# Patient Record
Sex: MALE | Race: WHITE | NOT HISPANIC OR LATINO | Employment: FULL TIME | URBAN - NONMETROPOLITAN AREA
[De-identification: names, ages, dates, MRNs, and addresses within clinical notes are randomized per-mention and may not be internally consistent; named-entity substitution may affect disease eponyms.]

---

## 2018-04-21 ENCOUNTER — OFFICE VISIT (OUTPATIENT)
Dept: FAMILY MEDICINE | Facility: OTHER | Age: 28
End: 2018-04-21
Attending: NURSE PRACTITIONER

## 2018-04-21 VITALS
TEMPERATURE: 98.9 F | HEIGHT: 70 IN | WEIGHT: 219.9 LBS | DIASTOLIC BLOOD PRESSURE: 74 MMHG | RESPIRATION RATE: 22 BRPM | HEART RATE: 86 BPM | BODY MASS INDEX: 31.48 KG/M2 | SYSTOLIC BLOOD PRESSURE: 120 MMHG

## 2018-04-21 DIAGNOSIS — L03.011 PARONYCHIA OF RIGHT INDEX FINGER: ICD-10-CM

## 2018-04-21 DIAGNOSIS — T14.8XXA SPLINTER IN SKIN: Primary | ICD-10-CM

## 2018-04-21 PROCEDURE — 99213 OFFICE O/P EST LOW 20 MIN: CPT | Performed by: NURSE PRACTITIONER

## 2018-04-21 ASSESSMENT — PAIN SCALES - GENERAL: PAINLEVEL: SEVERE PAIN (6)

## 2018-04-21 NOTE — NURSING NOTE
Patient presents to the clinic for right pointer finger possible infection. States it's been bothering him for about 2 weeks. No injury that he is aware of.  Afsaneh Vazquez LPN............. April 21, 2018 3:37 PM

## 2018-04-21 NOTE — PATIENT INSTRUCTIONS
Monitor for any infection - redness, swelling, pus    Try Ibuprofen or Naproxen for pain    Soak in warm water for 5-10 minutes twice daily    Use antibiotic ointment as needed    Follow up as needed

## 2018-04-21 NOTE — PROGRESS NOTES
"HPI:    Yo Tiwari is a 28 year old male  who presents to clinic today for finger pain.      States pain in right index finger for about 2 weeks.  Denies any known injury.  States swelling for the past 3-4 days with some redness around the fingernail.  No known splinters but he does work restoring and sandblasting log homes.   No drainage or bleeding.  Denies any numbness.   Occasional mild tingling.  No fevers.    Denies any home treatments - no soaking, no heat or ice.    No tylenol, ibuprofen or naproxen.          No past medical history on file.  No past surgical history on file.  Social History   Substance Use Topics     Smoking status: Former Smoker     Smokeless tobacco: Current User     Types: Chew     Alcohol use 1.2 oz/week     2 Shots of liquor per week      Comment: 2 shots of liquor per night     No current outpatient prescriptions on file.     No Known Allergies      Past medical history, past surgical history, current medications and allergies reviewed and accurate to the best of my knowledge.        ROS:  Refer to [facility]    /74 (BP Location: Left arm, Patient Position: Sitting, Cuff Size: Adult Regular)  Pulse 86  Temp 98.9  F (37.2  C) (Tympanic)  Resp 22  Ht 5' 10\" (1.778 m)  Wt 219 lb 14.4 oz (99.7 kg)  BMI 31.55 kg/m2    EXAM:  General Appearance: Well appearing adult male, appropriate appearance for age. No acute distress  Respiratory: Normal effort.  No cough appreciated.  Cardiovascular:  CMS intact to right upper extremity, brisk capillary refill, strong radial pulse  Musculoskeletal:  Right index finger with localized swelling and tenderness to palpation along the side of fingernail.  Normal movement of right fingers without difficulty.  Normal gait.  Equal movement of bilateral upper extremities.  Equal movement of bilateral lower extremities.    Dermatological: Right index finger with localized erythema, swelling, and fluctuance along the side of the nail.  Multiple tiny splints " noted under the tip of the fingernail of the right index finger.    Psychological: normal affect, alert and pleasant      Procedural note:   Options are discussed and Yo has elected to have an incision and drainage performed of his right index finger paronychia and removal of retrained splinters/slivers   Risks and benefits discussed.  Written consent obtained.    Right index finger soaked x 15 minutes in warm water.  A large bore filter needle tip was used to remove a retained splinter in the center of the paronychia with small amount of purulent drainage released.    Multiple tiny slivers were then removed using a large bore filter needle tip from the right index finger nail tip.  No bleeding present.    Patient tolerated the procedure well. No complications were appreciated.        ASSESSMENT/PLAN:    ICD-10-CM    1. Splinter in skin T14.8XXA REMOVE FOREIGN BODY SIMPLE   2. Paronychia of right index finger L03.011 CANCELED: DRAIN SKIN ABSCESS SIMPLE/SINGLE       Splinters removed from right index finger without complication or bleeding.  Paronychia was also drained during removal of splinters.    Discussed wound care at home - warm water soaks BID x 2 days, antibiotic ointment PRN, wear gloves to prevent future/further splinters.  Discussed monitoring for infection    Tylenol or ibuprofen or naproxen PRN    Discussed warning signs/symptoms indicative of need to f/u    Follow up if symptoms persist or worsen or concerns

## 2018-04-21 NOTE — MR AVS SNAPSHOT
"              After Visit Summary   4/21/2018    Yo Tiwari    MRN: [id number]           Patient Information     Date Of Birth          1990        Visit Information        Provider Department      4/21/2018 3:15 PM Alia Osullivan NP [facility]        Today's Diagnoses     Paronychia of right index finger    -  1    Splinter in skin          Care Instructions    Monitor for any infection - redness, swelling, pus    Try Ibuprofen or Naproxen for pain    Soak in warm water for 5-10 minutes twice daily    Use antibiotic ointment as needed    Follow up as needed          Follow-ups after your visit        Who to contact     If you have questions or need follow up information about today's clinic visit or your schedule please contact [facility] directly at [phone].  Normal or non-critical lab and imaging results will be communicated to you by sickweatherhart, letter or phone within 4 business days after the clinic has received the results. If you do not hear from us within 7 days, please contact the clinic through sickweatherhart or phone. If you have a critical or abnormal lab result, we will notify you by phone as soon as possible.  Submit refill requests through Ketsu or call your pharmacy and they will forward the refill request to us. Please allow 3 business days for your refill to be completed.          Additional Information About Your Visit        sickweatherhart Information     Ketsu lets you send messages to your doctor, view your test results, renew your prescriptions, schedule appointments and more. To sign up, go to [email or web address] . Click on \"Log in\" on the left side of the screen, which will take you to the Welcome page. Then click on \"Sign up Now\" on the right side of the page.     You will be asked to enter the access code listed below, as well as some personal information. Please follow the directions to create your username and password.     Your access " "code is: [email or web address]  Expires: 2018  4:22 PM     Your access code will  in 90 days. If you need help or a new code, please call your [facility] clinic or [phone].        Care EveryWhere ID     This is your Care EveryWhere ID. This could be used by other organizations to access your [facility] medical records  [id number]        Your Vitals Were     Pulse Temperature Respirations Height BMI (Body Mass Index)       86 98.9  F (37.2  C) (Tympanic) 22 5' 10\" (1.778 m) 31.55 kg/m2        Blood Pressure from Last 3 Encounters:   18 120/74    Weight from Last 3 Encounters:   18 219 lb 14.4 oz (99.7 kg)              We Performed the Following     DRAIN SKIN ABSCESS SIMPLE/SINGLE        Primary Care Provider Office Phone # Fax #    Isaac Cleveland [phone] [address] [facility] [address]        Equal Access to Services     [facility] AH: Hadii aad ku hadasho Soomaali, waaxda luqadaha, qaybta kaalmada adeegyada, waxay idiin hayaan jocelin kharash la'jem . So [facility] [phone].    ATENCIÓN: Si habla español, tiene a duvall disposición servicios gratuitos de asistencia lingüística. [facility] [phone].    We comply with applicable federal civil rights laws and [place] laws. We do not discriminate on the basis of race, color, national origin, age, disability, sex, sexual orientation, or gender identity.            Thank you!     Thank you for choosing [facility] AND [facility] [place]  for your care. Our goal is always to provide you with excellent care. Hearing back from our patients is one way we can continue to improve our services. Please take a few minutes to complete the written survey that you may receive in the mail after your visit with us. Thank you!             Your Updated Medication List - Protect others around you: Learn how to safely use, store and throw away your medicines at www.disposemymeds.org.      Notice  As of 2018  4:22 PM    You " have not been prescribed any medications.

## 2020-03-25 ENCOUNTER — VIRTUAL VISIT (OUTPATIENT)
Dept: FAMILY MEDICINE | Facility: OTHER | Age: 30
End: 2020-03-25
Attending: FAMILY MEDICINE

## 2020-03-25 VITALS — WEIGHT: 210 LBS | BODY MASS INDEX: 30.13 KG/M2

## 2020-03-25 DIAGNOSIS — R05.9 COUGH: Primary | ICD-10-CM

## 2020-03-25 PROCEDURE — 99212 OFFICE O/P EST SF 10 MIN: CPT | Mod: TEL | Performed by: FAMILY MEDICINE

## 2020-03-25 ASSESSMENT — PATIENT HEALTH QUESTIONNAIRE - PHQ9: SUM OF ALL RESPONSES TO PHQ QUESTIONS 1-9: 0

## 2020-03-25 ASSESSMENT — PAIN SCALES - GENERAL: PAINLEVEL: NO PAIN (0)

## 2020-03-25 NOTE — PROGRESS NOTES
"Yo Tiwari is a 29 year old male who is being evaluated via a billable telephone visit.    Patient requesting phone visit today for fever that started last night at 11pm lasting until 4-5 am. He did check this morning and his fever was down to 99.6 after ibuprofen. He does have an annoying cough. His mom and niece have been positive for strep in the the last week.     The patient has been notified of following:     \"This telephone visit will be conducted via a call between you and your physician/provider. We have found that certain health care needs can be provided without the need for a physical exam.  This service lets us provide the care you need with a short phone conversation.  If a prescription is necessary we can send it directly to your pharmacy.  If lab work is needed we can place an order for that and you can then stop by our lab to have the test done at a later time.    If during the course of the call the physician/provider feels a telephone visit is not appropriate, you will not be charged for this service.\"     Yo Tiwari complains of  No chief complaint on file.      I have reviewed and updated the patient's Past Medical History, Social History, Family History and Medication List.    ALLERGIES  Patient has no known allergies.      Additional provider notes: Patient calls concerned about a strep exposure.  He reports a tickle in his throat.  Fever last night of 99.6.  He has a little bit of annoying cough.  Was exposed to strep by his niece within the last week.  He is not complaining of any pain.  He is taken ibuprofen with some relief.  There is no cervical tenderness.  Cough is dry.  A headache last night.  He reports no muscle aches but does report a little joint ache last night.  No throat pain just a tickle.    Assessment/Plan:  1. Cough  I discussed the CDC recommendations for potential strep including muscle aches headache no cough no congestion.  Sore severe sore throat and cervical " adenopathy.  I does not meet the criteria for strep.  Advised him although I cannot completely rule that out a doubt that this is a strep throat.  He would need a clinic visit to evaluate it further.  He will continue with the ibuprofen.      Phone call duration: 12 minutes    Grant Broussard MD on 3/25/2020 at 2:20 PM

## 2020-12-27 ENCOUNTER — HEALTH MAINTENANCE LETTER (OUTPATIENT)
Age: 30
End: 2020-12-27

## 2021-06-29 NOTE — PROGRESS NOTES
Assessment & Plan     1. Anxiety  Patient's described symptoms and her longevity suggest a baseline anxiety that has been worsened with situational anxiety due to the recent passing of her sister-in-law.  Discussed treatment option including medication and/or therapy.  Patient would like proceed with medication at this time.  Given his brother has tolerated Lexapro well we will trial this with him as outlined below.  Educated on medication, use, side effects.  Will start with 1/2 tablet for 1 week then increase to 1 tablet daily going forward.  Follow-up in 4 weeks for recheck or sooner if needed  - escitalopram (LEXAPRO) 5 MG tablet; Take 1 tablet (5 mg) by mouth daily  Dispense: 30 tablet; Refill: 1  - hydrOXYzine (VISTARIL) 25 MG capsule; Take 1 capsule (25 mg) by mouth 3 times daily as needed for anxiety  Dispense: 30 capsule; Refill: 0    2. Situational anxiety  See #1.  - escitalopram (LEXAPRO) 5 MG tablet; Take 1 tablet (5 mg) by mouth daily  Dispense: 30 tablet; Refill: 1  - hydrOXYzine (VISTARIL) 25 MG capsule; Take 1 capsule (25 mg) by mouth 3 times daily as needed for anxiety  Dispense: 30 capsule; Refill: 0    3. Palpitations  Differential includes anxiety related, anxiety/panic attack, cardiac arrhythmia, electrolyte abnormalities, thyroid disease, etc.  EKG unremarkable other than sinus bradycardia today per my interpretation.  Lab work as outlined below was unremarkable.  Offered further work-up with Zio patch however patient would like to try medication as outlined above and if he continues to have symptoms we will proceed forward with Zio patch and possible echocardiogram.  Given warning signs and symptoms for need to follow-up sooner.  - CBC and Differential; Future  - Basic Metabolic Panel; Future  - Magnesium; Future  - TSH Reflex GH; Future  - EKG 12-lead, tracing only  - TSH Reflex GH  - Magnesium  - Basic Metabolic Panel  - CBC and Differential      No follow-ups on file.    Yuli ANN  SALTY Alatorre  [facility] AND [facility]    aDnia Ram is a 31 year old who presents for the following health issues    HPI   Here for discussion regarding mental health.  Patient states that he feels like he has trouble with baseline anxiety for quite some time however over the past month symptoms of anxiety and depression have worsened.  Approximately 1 month ago his sister-in-law passed away in a motor vehicle accident.  Since that time he has been living with his brother.  He does have good support system with family and.  He states that he has been struggling with excessive worry, anxiousness feeling down and depressed, anhedonia, decreased motivation.  Depending on the day his appetite varies.  Is having some difficulties with falling and staying asleep.  He is also noting that there has been a few episodes where he is feeling chest tightness, heart palpitations.  He is unsure if his anxiety is causing this however he does have an increased anxiety when he is feeling the symptoms.  No associated chest pain or pressure, dizziness, lightheadedness, syncope, headaches, vision changes, shortness of breath.  Patient states that he has had syncopal events in the past that was likely due to dehydration and being in warm weather and that causes him anxiety as well as he is nervous it may happen again.  Denies current self-harm, suicidal, homicidal thoughts.  He has never been on mental health medications.  He does have a strong family history of anxiety.  His brother does well with Lexapro and Wellbutrin for management.      PAST MEDICAL HISTORY: No past medical history on file.    PAST SURGICAL HISTORY: No past surgical history on file.    FAMILY HISTORY: No family history on file.    SOCIAL HISTORY:   Social History     Tobacco Use     Smoking status: Former Smoker     Smokeless tobacco: Current User     Types: Chew   Substance Use Topics     Alcohol use: Yes     Alcohol/week: 2.0 standard drinks      "Types: 2 Shots of liquor per week     Comment: 4  drinks a week       No Known Allergies    Current Outpatient Medications   Medication     escitalopram (LEXAPRO) 5 MG tablet     hydrOXYzine (VISTARIL) 25 MG capsule     No current facility-administered medications for this visit.          Review of Systems   Per HPI        Objective    /76   Pulse 80   Temp 98.1  F (36.7  C)   Resp 12   Ht 1.778 m (5' 10\")   Wt 98.6 kg (217 lb 6.4 oz)   SpO2 97%   BMI 31.19 kg/m    Body mass index is 31.19 kg/m .  Physical Exam   General: Pleasant, in no apparent distress.  Cardiovascular: Regular rate and rhythm with S1 equal to S2. No murmurs, friction rubs, or gallops.   Respiratory: Lungs are resonant and clear to auscultation bilaterally. No wheezes, crackles, or rhonchi.  Psych: Appropriate mood and affect.    PHQ 3/25/2020 6/30/2021   PHQ-9 Total Score 0 11   Q9: Thoughts of better off dead/self-harm past 2 weeks Not at all Not at all     SRAVANTHI-7 SCORE 6/30/2021   Total Score 10       Results for orders placed or performed in visit on 06/30/21   TSH Reflex GH     Status: None   Result Value Ref Range    TSH Reflex 1.39 0.34 - 5.60 IU/mL   Magnesium     Status: None   Result Value Ref Range    Magnesium 2.1 1.9 - 2.7 mg/dL   Basic Metabolic Panel     Status: None   Result Value Ref Range    Sodium 140 134 - 144 mmol/L    Potassium 4.2 3.5 - 5.1 mmol/L    Chloride 105 98 - 107 mmol/L    Carbon Dioxide 25 21 - 31 mmol/L    Anion Gap 10 3 - 14 mmol/L    Glucose 98 70 - 105 mg/dL    Urea Nitrogen 16 7 - 25 mg/dL    Creatinine 0.91 0.70 - 1.30 mg/dL    GFR Estimate >90 >60 mL/min/[1.73_m2]    GFR Estimate If Black >90 >60 mL/min/[1.73_m2]    Calcium 9.5 8.6 - 10.3 mg/dL   CBC and Differential     Status: None   Result Value Ref Range    WBC 7.0 4.0 - 11.0 10e9/L    RBC Count 5.35 4.4 - 5.9 10e12/L    Hemoglobin 15.8 13.3 - 17.7 g/dL    Hematocrit 47.2 40.0 - 53.0 %    MCV 88 78 - 100 fl    MCH 29.5 26.5 - 33.0 pg    MCHC " 33.5 31.5 - 36.5 g/dL    RDW 11.9 10.0 - 15.0 %    Platelet Count 313 150 - 450 10e9/L    Diff Method Automated Method     % Neutrophils 66.7 %    % Lymphocytes 19.6 %    % Monocytes 12.1 %    % Eosinophils 0.6 %    % Basophils 0.6 %    % Immature Granulocytes 0.4 %    Absolute Neutrophil 4.7 1.6 - 8.3 10e9/L    Absolute Lymphocytes 1.4 0.8 - 5.3 10e9/L    Absolute Monocytes 0.9 0.0 - 1.3 10e9/L    Absolute Eosinophils 0.0 0.0 - 0.7 10e9/L    Absolute Basophils 0.0 0.0 - 0.2 10e9/L    Abs Immature Granulocytes 0.0 0 - 0.4 10e9/L   EKG 12-lead, tracing only     Status: None (Preliminary result)   Result Value Ref Range    Interpretation ECG Click View Image link to view waveform and result

## 2021-06-30 ENCOUNTER — OFFICE VISIT (OUTPATIENT)
Dept: FAMILY MEDICINE | Facility: OTHER | Age: 31
End: 2021-06-30
Attending: PHYSICIAN ASSISTANT

## 2021-06-30 VITALS
HEIGHT: 70 IN | SYSTOLIC BLOOD PRESSURE: 138 MMHG | HEART RATE: 80 BPM | OXYGEN SATURATION: 97 % | WEIGHT: 217.4 LBS | BODY MASS INDEX: 31.12 KG/M2 | TEMPERATURE: 98.1 F | DIASTOLIC BLOOD PRESSURE: 76 MMHG | RESPIRATION RATE: 12 BRPM

## 2021-06-30 DIAGNOSIS — R00.2 PALPITATIONS: ICD-10-CM

## 2021-06-30 DIAGNOSIS — F41.8 SITUATIONAL ANXIETY: ICD-10-CM

## 2021-06-30 DIAGNOSIS — F41.9 ANXIETY: Primary | ICD-10-CM

## 2021-06-30 LAB
ANION GAP SERPL CALCULATED.3IONS-SCNC: 10 MMOL/L (ref 3–14)
BASOPHILS # BLD AUTO: 0 10E9/L (ref 0–0.2)
BASOPHILS NFR BLD AUTO: 0.6 %
BUN SERPL-MCNC: 16 MG/DL (ref 7–25)
CALCIUM SERPL-MCNC: 9.5 MG/DL (ref 8.6–10.3)
CHLORIDE SERPL-SCNC: 105 MMOL/L (ref 98–107)
CO2 SERPL-SCNC: 25 MMOL/L (ref 21–31)
CREAT SERPL-MCNC: 0.91 MG/DL (ref 0.7–1.3)
DIFFERENTIAL METHOD BLD: NORMAL
EOSINOPHIL # BLD AUTO: 0 10E9/L (ref 0–0.7)
EOSINOPHIL NFR BLD AUTO: 0.6 %
ERYTHROCYTE [DISTWIDTH] IN BLOOD BY AUTOMATED COUNT: 11.9 % (ref 10–15)
GFR SERPL CREATININE-BSD FRML MDRD: >90 ML/MIN/{1.73_M2}
GLUCOSE SERPL-MCNC: 98 MG/DL (ref 70–105)
HCT VFR BLD AUTO: 47.2 % (ref 40–53)
HGB BLD-MCNC: 15.8 G/DL (ref 13.3–17.7)
IMM GRANULOCYTES # BLD: 0 10E9/L (ref 0–0.4)
IMM GRANULOCYTES NFR BLD: 0.4 %
INTERPRETATION ECG - MUSE: NORMAL
LYMPHOCYTES # BLD AUTO: 1.4 10E9/L (ref 0.8–5.3)
LYMPHOCYTES NFR BLD AUTO: 19.6 %
MAGNESIUM SERPL-MCNC: 2.1 MG/DL (ref 1.9–2.7)
MCH RBC QN AUTO: 29.5 PG (ref 26.5–33)
MCHC RBC AUTO-ENTMCNC: 33.5 G/DL (ref 31.5–36.5)
MCV RBC AUTO: 88 FL (ref 78–100)
MONOCYTES # BLD AUTO: 0.9 10E9/L (ref 0–1.3)
MONOCYTES NFR BLD AUTO: 12.1 %
NEUTROPHILS # BLD AUTO: 4.7 10E9/L (ref 1.6–8.3)
NEUTROPHILS NFR BLD AUTO: 66.7 %
PLATELET # BLD AUTO: 313 10E9/L (ref 150–450)
POTASSIUM SERPL-SCNC: 4.2 MMOL/L (ref 3.5–5.1)
RBC # BLD AUTO: 5.35 10E12/L (ref 4.4–5.9)
SODIUM SERPL-SCNC: 140 MMOL/L (ref 134–144)
TSH SERPL DL<=0.05 MIU/L-ACNC: 1.39 IU/ML (ref 0.34–5.6)
WBC # BLD AUTO: 7 10E9/L (ref 4–11)

## 2021-06-30 PROCEDURE — 83735 ASSAY OF MAGNESIUM: CPT | Mod: ZL | Performed by: PHYSICIAN ASSISTANT

## 2021-06-30 PROCEDURE — 80048 BASIC METABOLIC PNL TOTAL CA: CPT | Mod: ZL | Performed by: PHYSICIAN ASSISTANT

## 2021-06-30 PROCEDURE — 84443 ASSAY THYROID STIM HORMONE: CPT | Mod: ZL | Performed by: PHYSICIAN ASSISTANT

## 2021-06-30 PROCEDURE — 93000 ELECTROCARDIOGRAM COMPLETE: CPT | Performed by: INTERNAL MEDICINE

## 2021-06-30 PROCEDURE — 36415 COLL VENOUS BLD VENIPUNCTURE: CPT | Mod: ZL | Performed by: PHYSICIAN ASSISTANT

## 2021-06-30 PROCEDURE — 85025 COMPLETE CBC W/AUTO DIFF WBC: CPT | Mod: ZL | Performed by: PHYSICIAN ASSISTANT

## 2021-06-30 PROCEDURE — 99214 OFFICE O/P EST MOD 30 MIN: CPT | Performed by: PHYSICIAN ASSISTANT

## 2021-06-30 RX ORDER — HYDROXYZINE PAMOATE 25 MG/1
25 CAPSULE ORAL 3 TIMES DAILY PRN
Qty: 30 CAPSULE | Refills: 0 | Status: SHIPPED | OUTPATIENT
Start: 2021-06-30

## 2021-06-30 RX ORDER — ESCITALOPRAM OXALATE 5 MG/1
5 TABLET ORAL DAILY
Qty: 30 TABLET | Refills: 1 | Status: SHIPPED | OUTPATIENT
Start: 2021-06-30 | End: 2022-10-15

## 2021-06-30 ASSESSMENT — ANXIETY QUESTIONNAIRES
6. BECOMING EASILY ANNOYED OR IRRITABLE: NOT AT ALL
2. NOT BEING ABLE TO STOP OR CONTROL WORRYING: NEARLY EVERY DAY
GAD7 TOTAL SCORE: 10
5. BEING SO RESTLESS THAT IT IS HARD TO SIT STILL: NOT AT ALL
1. FEELING NERVOUS, ANXIOUS, OR ON EDGE: NEARLY EVERY DAY
IF YOU CHECKED OFF ANY PROBLEMS ON THIS QUESTIONNAIRE, HOW DIFFICULT HAVE THESE PROBLEMS MADE IT FOR YOU TO DO YOUR WORK, TAKE CARE OF THINGS AT HOME, OR GET ALONG WITH OTHER PEOPLE: SOMEWHAT DIFFICULT
7. FEELING AFRAID AS IF SOMETHING AWFUL MIGHT HAPPEN: NOT AT ALL
3. WORRYING TOO MUCH ABOUT DIFFERENT THINGS: MORE THAN HALF THE DAYS

## 2021-06-30 ASSESSMENT — MIFFLIN-ST. JEOR: SCORE: 1937.37

## 2021-06-30 ASSESSMENT — PAIN SCALES - GENERAL: PAINLEVEL: NO PAIN (1)

## 2021-06-30 ASSESSMENT — PATIENT HEALTH QUESTIONNAIRE - PHQ9
SUM OF ALL RESPONSES TO PHQ QUESTIONS 1-9: 11
5. POOR APPETITE OR OVEREATING: MORE THAN HALF THE DAYS

## 2021-06-30 NOTE — NURSING NOTE
Patient presents to clinic to discuss anxiety.  Tania Charles LPN ....................  6/30/2021   10:34 AM

## 2021-06-30 NOTE — PATIENT INSTRUCTIONS
We prescribed an anxiety medication called Lexapro today. I want you to start with taking 1/2 tablet once daily for one week then increase to one full tablet once daily going forward. We will recheck in one month or sooner if needed.     Also prescribed an as needed anxiety medication called hydroxyzine. This medication does not need to be taken every day, only when you feel like your anxiety is higher.

## 2021-07-01 ASSESSMENT — ANXIETY QUESTIONNAIRES: GAD7 TOTAL SCORE: 10

## 2021-07-27 NOTE — PROGRESS NOTES
Assessment & Plan     1. Anxiety  Continues to struggle with anxiety and depression related symptoms.  Did not notice any improvement with Lexapro.  Will discontinue this medication and transition to Prozac.  Scription as outlined below.  Educated on medication, use, side effects.  Did offer short course of benzodiazepine for breakthrough anxiety however patient would like to continue with hydroxyzine as needed for now.  Follow-up in 4 weeks for recheck or sooner if needed.  - FLUoxetine (PROZAC) 10 MG tablet; Take 1 tablet (10 mg) by mouth daily  Dispense: 30 tablet; Refill: 0    2. Situational anxiety  See #1.  - FLUoxetine (PROZAC) 10 MG tablet; Take 1 tablet (10 mg) by mouth daily  Dispense: 30 tablet; Refill: 0      Return in about 4 weeks (around 8/25/2021).    Yuli Alatorre PA-C  [facility] AND [facility] is a 31 year old who presents for the following health issues     HPI   Here for follow up on mental health. Last seen 6/30 and was diagnosed with baseline anxiety, situational anxiety secondary to the passing of his sister in law and was evaluated for palpitations. Cardiac work up was unremarkable, offered Zio patch and echocardiogram but was not completed by patient. He was started on Lexapro and hydroxyzine for anxiety.     Patient is presenting today noticing minimal improvement with Lexapro.  States he is continuing to experience depression and anxiety related symptoms.  Continued anhedonia, decreased motivation.  His anxiety is increased since he was last seen.  Does not seem to be associated with any particular time of day or events.  Hydroxyzine does provide some relief but he is often too late and taking it. Did not notice any side effects.  Like to consider trying alternative daily medication.  Denies self-harm or suicidal thoughts.        PAST MEDICAL HISTORY: History reviewed. No pertinent past medical history.    PAST SURGICAL HISTORY: History reviewed. No  "pertinent surgical history.    FAMILY HISTORY: History reviewed. No pertinent family history.    SOCIAL HISTORY:   Social History     Tobacco Use     Smoking status: Former Smoker     Smokeless tobacco: Current User     Types: Chew   Substance Use Topics     Alcohol use: Yes     Alcohol/week: 2.0 standard drinks     Types: 2 Shots of liquor per week     Comment: 4  drinks a week       No Known Allergies  Current Outpatient Medications   Medication     escitalopram (LEXAPRO) 5 MG tablet     FLUoxetine (PROZAC) 10 MG tablet     hydrOXYzine (VISTARIL) 25 MG capsule     No current facility-administered medications for this visit.         Review of Systems   Per HPI        Objective    /74   Pulse 68   Temp 97.7  F (36.5  C)   Resp 12   Ht 1.778 m (5' 10\")   Wt 97.1 kg (214 lb)   SpO2 97%   BMI 30.71 kg/m    Body mass index is 30.71 kg/m .  Physical Exam   General: Pleasant, in no apparent distress.  Psych: Appropriate mood and affect.    SRAVANTHI-7 SCORE 6/30/2021 7/28/2021   Total Score 10 5       PHQ 3/25/2020 6/30/2021 7/28/2021   PHQ-9 Total Score 0 11 9   Q9: Thoughts of better off dead/self-harm past 2 weeks Not at all Not at all Not at all             "

## 2021-07-28 ENCOUNTER — OFFICE VISIT (OUTPATIENT)
Dept: FAMILY MEDICINE | Facility: OTHER | Age: 31
End: 2021-07-28
Attending: PHYSICIAN ASSISTANT

## 2021-07-28 VITALS
BODY MASS INDEX: 30.64 KG/M2 | OXYGEN SATURATION: 97 % | WEIGHT: 214 LBS | DIASTOLIC BLOOD PRESSURE: 74 MMHG | HEART RATE: 68 BPM | HEIGHT: 70 IN | SYSTOLIC BLOOD PRESSURE: 122 MMHG | TEMPERATURE: 97.7 F | RESPIRATION RATE: 12 BRPM

## 2021-07-28 DIAGNOSIS — F41.9 ANXIETY: Primary | ICD-10-CM

## 2021-07-28 DIAGNOSIS — F41.8 SITUATIONAL ANXIETY: ICD-10-CM

## 2021-07-28 PROCEDURE — 99213 OFFICE O/P EST LOW 20 MIN: CPT | Performed by: PHYSICIAN ASSISTANT

## 2021-07-28 RX ORDER — FLUOXETINE 10 MG/1
10 TABLET, FILM COATED ORAL DAILY
Qty: 30 TABLET | Refills: 0 | Status: SHIPPED | OUTPATIENT
Start: 2021-07-28 | End: 2021-08-27

## 2021-07-28 ASSESSMENT — ANXIETY QUESTIONNAIRES
2. NOT BEING ABLE TO STOP OR CONTROL WORRYING: SEVERAL DAYS
5. BEING SO RESTLESS THAT IT IS HARD TO SIT STILL: NOT AT ALL
IF YOU CHECKED OFF ANY PROBLEMS ON THIS QUESTIONNAIRE, HOW DIFFICULT HAVE THESE PROBLEMS MADE IT FOR YOU TO DO YOUR WORK, TAKE CARE OF THINGS AT HOME, OR GET ALONG WITH OTHER PEOPLE: NOT DIFFICULT AT ALL
3. WORRYING TOO MUCH ABOUT DIFFERENT THINGS: SEVERAL DAYS
7. FEELING AFRAID AS IF SOMETHING AWFUL MIGHT HAPPEN: NOT AT ALL
1. FEELING NERVOUS, ANXIOUS, OR ON EDGE: SEVERAL DAYS
6. BECOMING EASILY ANNOYED OR IRRITABLE: NOT AT ALL
GAD7 TOTAL SCORE: 5

## 2021-07-28 ASSESSMENT — PATIENT HEALTH QUESTIONNAIRE - PHQ9
SUM OF ALL RESPONSES TO PHQ QUESTIONS 1-9: 9
5. POOR APPETITE OR OVEREATING: MORE THAN HALF THE DAYS

## 2021-07-28 ASSESSMENT — PAIN SCALES - GENERAL: PAINLEVEL: NO PAIN (0)

## 2021-07-28 ASSESSMENT — MIFFLIN-ST. JEOR: SCORE: 1921.95

## 2021-07-28 NOTE — NURSING NOTE
Patient presents to clinic requesting to discuss lexapro. He states that he feel like its not helping much.  Tania Charles LPN ....................  7/28/2021   9:44 AM

## 2021-07-29 ASSESSMENT — ANXIETY QUESTIONNAIRES: GAD7 TOTAL SCORE: 5

## 2021-09-22 DIAGNOSIS — F41.8 SITUATIONAL ANXIETY: ICD-10-CM

## 2021-09-22 DIAGNOSIS — F41.9 ANXIETY: ICD-10-CM

## 2021-09-22 RX ORDER — FLUOXETINE 10 MG/1
TABLET, FILM COATED ORAL
Qty: 7 TABLET | Refills: 0 | Status: SHIPPED | OUTPATIENT
Start: 2021-09-22 | End: 2021-11-03

## 2021-10-04 ENCOUNTER — VIRTUAL VISIT (OUTPATIENT)
Dept: FAMILY MEDICINE | Facility: OTHER | Age: 31
End: 2021-10-04
Attending: PHYSICIAN ASSISTANT

## 2021-10-04 DIAGNOSIS — F41.9 ANXIETY: ICD-10-CM

## 2021-10-04 DIAGNOSIS — F41.8 SITUATIONAL ANXIETY: ICD-10-CM

## 2021-10-04 PROCEDURE — 99212 OFFICE O/P EST SF 10 MIN: CPT | Mod: 95 | Performed by: PHYSICIAN ASSISTANT

## 2021-10-04 RX ORDER — FLUOXETINE 10 MG/1
10 TABLET, FILM COATED ORAL DAILY
Qty: 7 TABLET | Refills: 0 | Status: CANCELLED | OUTPATIENT
Start: 2021-10-04

## 2021-10-04 RX ORDER — HYDROXYZINE PAMOATE 25 MG/1
25 CAPSULE ORAL 3 TIMES DAILY PRN
Qty: 30 CAPSULE | Refills: 3 | Status: CANCELLED | OUTPATIENT
Start: 2021-10-04

## 2021-10-04 RX ORDER — ESCITALOPRAM OXALATE 5 MG/1
5 TABLET ORAL DAILY
Qty: 30 TABLET | Refills: 3 | Status: CANCELLED | OUTPATIENT
Start: 2021-10-04

## 2021-10-04 ASSESSMENT — ANXIETY QUESTIONNAIRES
IF YOU CHECKED OFF ANY PROBLEMS ON THIS QUESTIONNAIRE, HOW DIFFICULT HAVE THESE PROBLEMS MADE IT FOR YOU TO DO YOUR WORK, TAKE CARE OF THINGS AT HOME, OR GET ALONG WITH OTHER PEOPLE: NOT DIFFICULT AT ALL
7. FEELING AFRAID AS IF SOMETHING AWFUL MIGHT HAPPEN: NOT AT ALL
1. FEELING NERVOUS, ANXIOUS, OR ON EDGE: NOT AT ALL
GAD7 TOTAL SCORE: 2
2. NOT BEING ABLE TO STOP OR CONTROL WORRYING: NOT AT ALL
3. WORRYING TOO MUCH ABOUT DIFFERENT THINGS: SEVERAL DAYS
5. BEING SO RESTLESS THAT IT IS HARD TO SIT STILL: NOT AT ALL
6. BECOMING EASILY ANNOYED OR IRRITABLE: NOT AT ALL

## 2021-10-04 ASSESSMENT — PATIENT HEALTH QUESTIONNAIRE - PHQ9
5. POOR APPETITE OR OVEREATING: SEVERAL DAYS
SUM OF ALL RESPONSES TO PHQ QUESTIONS 1-9: 6

## 2021-10-04 NOTE — PROGRESS NOTES
Yo is a 31 year old who is being evaluated via a billable telephone visit.      What phone number would you like to be contacted at? [phone]  How would you like to obtain your AVS? Ignacio    Assessment & Plan     1. Anxiety  Initially noticed significant improvement in symptoms after being on the Prozac for a few weeks but then symptoms gradually started to return.  Will increase Prozac to 20 mg as below.  Continue using hydroxyzine as needed for breakthrough anxiety.  Follow-up in 1 month or sooner for recheck.  Consider working with a therapist as well.  - FLUoxetine (PROZAC) 20 MG capsule; Take 1 capsule (20 mg) by mouth daily  Dispense: 30 capsule; Refill: 1    2. Situational anxiety  See #1.   - FLUoxetine (PROZAC) 20 MG capsule; Take 1 capsule (20 mg) by mouth daily  Dispense: 30 capsule; Refill: 1      Return in about 4 weeks (around 11/1/2021), or if symptoms worsen or fail to improve.    Yuli Alatorre PA-C  [facility]    Subjective   Yo is a 31 year old who presents for the following health issues     HPI   Contacted via telephone for follow up on anxiety. Had been struggling with baseline anxiety for some time that was worsened with the passing of his sister in law a few months ago. Initially started on Lexapro and hydroxyzine but did not notice much improvement. Transitioned to Prozac on 7/28.  He did have some side effects for the first week or so but those resolved on their own.  He reports for a couple weeks he noticed significant improvement in his symptoms.  He had motivation, interest in going out and doing things.  He noticed since that time symptoms have gradually started to return to where they were prior to medication.  He does not feel he is back at his baseline of anxiety and depression symptoms but does notice less improvement.  Has been taking medication daily as directed.  Would like to consider increasing the dose.  Not working with  therapist.        PAST MEDICAL HISTORY: History reviewed. No pertinent past medical history.    PAST SURGICAL HISTORY: History reviewed. No pertinent surgical history.    FAMILY HISTORY: History reviewed. No pertinent family history.    SOCIAL HISTORY:   Social History     Tobacco Use     Smoking status: Former Smoker     Smokeless tobacco: Current User     Types: Chew   Substance Use Topics     Alcohol use: Yes     Alcohol/week: 2.0 standard drinks     Types: 2 Shots of liquor per week     Comment: 4  drinks a week       No Known Allergies  Current Outpatient Medications   Medication     escitalopram (LEXAPRO) 5 MG tablet     FLUoxetine (PROZAC) 10 MG tablet     FLUoxetine (PROZAC) 20 MG capsule     hydrOXYzine (VISTARIL) 25 MG capsule     No current facility-administered medications for this visit.         Review of Systems   Constitutional, HEENT, cardiovascular, pulmonary, gi and gu systems are negative, except as otherwise noted.      Objective           Vitals:  No vitals were obtained today due to virtual visit.    Physical Exam   healthy, alert and no distress  PSYCH: Alert and oriented times 3; coherent speech, normal   rate and volume, able to articulate logical thoughts, able   to abstract reason, no tangential thoughts, no hallucinations   or delusions  His affect is normal  RESP: No cough, no audible wheezing, able to talk in full sentences  Remainder of exam unable to be completed due to telephone visits          Phone call duration: 7 minutes

## 2021-10-04 NOTE — NURSING NOTE
Patient would like refill on meds. He states that the Prozac worked great for a week, he was getting out and about but now not feeling the same.  Tania Charles LPN ....................  10/4/2021   7:53 AM

## 2021-10-05 ASSESSMENT — ANXIETY QUESTIONNAIRES: GAD7 TOTAL SCORE: 2

## 2021-11-03 ENCOUNTER — MYC MEDICAL ADVICE (OUTPATIENT)
Dept: FAMILY MEDICINE | Facility: OTHER | Age: 31
End: 2021-11-03

## 2021-11-03 DIAGNOSIS — F41.8 SITUATIONAL ANXIETY: ICD-10-CM

## 2021-11-03 DIAGNOSIS — F41.9 ANXIETY: ICD-10-CM

## 2022-01-29 ENCOUNTER — HEALTH MAINTENANCE LETTER (OUTPATIENT)
Age: 32
End: 2022-01-29

## 2022-09-17 ENCOUNTER — HEALTH MAINTENANCE LETTER (OUTPATIENT)
Age: 32
End: 2022-09-17

## 2022-10-15 ENCOUNTER — HOSPITAL ENCOUNTER (EMERGENCY)
Facility: OTHER | Age: 32
Discharge: HOME | End: 2022-10-15
Attending: INTERNAL MEDICINE | Admitting: INTERNAL MEDICINE

## 2022-10-15 ENCOUNTER — APPOINTMENT (OUTPATIENT)
Dept: GENERAL RADIOLOGY | Facility: OTHER | Age: 32
End: 2022-10-15
Attending: INTERNAL MEDICINE

## 2022-10-15 VITALS
BODY MASS INDEX: 27.77 KG/M2 | HEIGHT: 70 IN | RESPIRATION RATE: 16 BRPM | SYSTOLIC BLOOD PRESSURE: 140 MMHG | DIASTOLIC BLOOD PRESSURE: 92 MMHG | HEART RATE: 53 BPM | WEIGHT: 194 LBS | TEMPERATURE: 98.8 F | OXYGEN SATURATION: 97 %

## 2022-10-15 DIAGNOSIS — S93.402A INVERSION SPRAIN OF LEFT ANKLE, INITIAL ENCOUNTER: ICD-10-CM

## 2022-10-15 PROCEDURE — 99284 EMERGENCY DEPT VISIT MOD MDM: CPT | Performed by: INTERNAL MEDICINE

## 2022-10-15 PROCEDURE — 73630 X-RAY EXAM OF FOOT: CPT | Mod: LT

## 2022-10-15 PROCEDURE — 99282 EMERGENCY DEPT VISIT SF MDM: CPT | Performed by: INTERNAL MEDICINE

## 2022-10-15 PROCEDURE — 73590 X-RAY EXAM OF LOWER LEG: CPT | Mod: LT

## 2022-10-15 NOTE — DISCHARGE INSTRUCTIONS
Consider using an ankle splint to help with inversion ankle sprain of the left ankle.  Activity as tolerated.  Use splint as needed.  Ice packs as needed.  Tylenol and ibuprofen as needed.  Follow home care instructions.  Consider follow-up x-rays if symptoms are not slowly improving over the next few weeks.

## 2022-10-15 NOTE — ED PROVIDER NOTES
Emergency Department Provider Note  : 1990 Age: 32 year old Sex: male MRN: [id number]    Chief Complaint   Patient presents with     Foot Pain       Medical Decision Making / Assessment / Plan   32 year old male presenting with left ankle sprain.    ED Course as of 10/15/22 1802   Sat Oct 15, 2022   1758 Patient evaluated.  X-rays obtained.  No fractures noted.  Home care instructions provided.  Patient discharged home in stable condition.  Rest, ice, elevate as needed.  Tylenol ibuprofen as needed.  Ice packs.  Consider ankle splint as needed.    Patient discharged home in stable condition.  Follow-up as outpatient if symptoms are not improving over the next couple of weeks.  May need to consider follow-up x-rays.      The patient and brother were informed of the plan and verbalized understanding and agreed with the plan. The patient was given strict return to Emergency Department precautions as well as appropriate follow up instructions. The patient was discharged in stable condition.    New Prescriptions    No medications on file       Final diagnoses:   Inversion sprain of left ankle, initial encounter       Amado Carl MD  10/15/2022   Emergency Department    [place]   Yo is a 32 year old male who presents at  5:11 PM with inversion ankle injury.  He jumped down from [employer] and landed on something in the ground that he was not aware was there.  Ended up rolling his ankle.  He took off his work boots and socks and had a very large lump/hematoma over the outer left ankle/foot area.  Brother was concerned and brought him in for evaluation.  He could walk but was limping a lot.  Has pain with dorsiflexion of the foot, other movement of the foot/ankle.    No other injuries.  No head injury.  No neck injury.  No loss of consciousness.    I have reviewed the Medications, Allergies, Past Medical and Surgical History, and Social History in the [employer] [facility] and with family.    Review of Systems:  Please  "see Subjective / HPI for pertinent positives and negatives. All other systems reviewed and found to be negative.      Objective     Patient Vitals for the past 24 hrs:   BP Temp Temp src Pulse Resp SpO2 Height Weight   10/15/22 1714 (!) 140/92 98.8  F (37.1  C) Tympanic 53 16 97 % 1.778 m (5' 10\") 88 kg (194 lb)       Physical Exam:   General: Awake, alert, in no acute distress.  Head: Normocephalic, atraumatic.  Eyes: Conjugate gaze.  Chest/Respiratory: Equal chest rise.  Cardiovascular: Peripheral pulses present.  Abdominal: Soft, non-distended, non-tender.  Extremities: Ecchymosis over the outer left foot/ankle area.  Otherwise, no obvious ankle/foot deformity.  Neurological: GCS 15, moving all extremities without gross deficit.  Skin: Warm, no rashes, lesions.  Bruising over the outer left ankle/foot area.  Psychiatric: Appropriate affect.     Procedures / Critical Care   Procedures    Critical Care Time: None.     Orders Placed This Encounter   Procedures     XR Foot Left G/E 3 Views     XR Tibia and Fibula Left 2 Views       RESULTS:  Results for orders placed or performed during the hospital encounter of 10/15/22   XR Foot Left G/E 3 Views     Status: None    Narrative    PROCEDURE:  XR FOOT LEFT G/E 3 VIEWS, XR TIBIA AND FIBULA LEFT 2 VIEWS    HISTORY: left foot pain / ankle pain after jumping off tailgate.    COMPARISON:  None.    TECHNIQUE:  3 views left foot, 4 views left tibia and fibula.    FINDINGS:  No fracture or dislocation is identified. The joint spaces  are preserved. No foreign body is seen.       Impression    IMPRESSION: No acute fracture of the left foot or left tibia/fibula.   Consider follow-up.    JOSE ALFREDO NOYOLA MD         SYSTEM ID:  [id number]   XR Tibia and Fibula Left 2 Views     Status: None    Narrative    PROCEDURE:  XR FOOT LEFT G/E 3 VIEWS, XR TIBIA AND FIBULA LEFT 2 VIEWS    HISTORY: left foot pain / ankle pain after jumping off tailgate.    COMPARISON:  None.    TECHNIQUE:  " 3 views left foot, 4 views left tibia and fibula.    FINDINGS:  No fracture or dislocation is identified. The joint spaces  are preserved. No foreign body is seen.       Impression    IMPRESSION: No acute fracture of the left foot or left tibia/fibula.   Consider follow-up.    JOSE ALFREDO NOYOLA MD         SYSTEM ID:  [id number]           Medical/Surgical History:  History reviewed. No pertinent past medical history.  History reviewed. No pertinent surgical history.    Medications:  No current facility-administered medications for this encounter.     Current Outpatient Medications   Medication     FLUoxetine (PROZAC) 20 MG capsule     hydrOXYzine (VISTARIL) 25 MG capsule       Allergies:  Patient has no known allergies.    Relevant labs, images, EKGs, Epic and outside hospital (if applicable) charts were reviewed. The findings, diagnosis, plan, and need for follow up were discussed with the patient/family. Nursing notes were reviewed.      Amado Carl MD  10/15/22 3882

## 2022-10-15 NOTE — ED TRIAGE NOTES
Patient jumped off the tailgate of his pickup at about 1630 and rolled his ankle.  Can bear some weight but very painful to do so.  Pain to outer left foot with some swelling and bruising noted.  Some pain noted to radiate up his leg.

## 2022-11-06 DIAGNOSIS — F41.8 SITUATIONAL ANXIETY: ICD-10-CM

## 2022-11-06 DIAGNOSIS — F41.9 ANXIETY: ICD-10-CM

## 2022-11-06 NOTE — LETTER
November 8, 2022      Yo Tiwari  [address] [facility] [address]        Dear Yo,     This letter is to remind you that you are due for your annual exam with Yuli Alatorre. Your last comprehensive visit was more than 12 months ago.    Please call the clinic at [phone] to schedule your appointment.    If you should require additional refills before your scheduled appointment, please contact your pharmacy and we will refill your medication until the date of your appointment.    If you are no longer seeing Yuli Alatorre for primary care, please call to let us know.       Thank you for choosing [facility] for your health care needs.    Sincerely,    Refill JA  [facility]

## 2022-11-08 NOTE — TELEPHONE ENCOUNTER
Routing refill request to provider for review/approval because:    LOV: 10/4/21    Patient is due for annual review   Letter and my chart message sent  Will route to outreach to call patient and help assist in scheduling appointment.    Aleksandra Peralta RN on 11/8/2022 at 9:04 AM

## 2022-11-15 NOTE — TELEPHONE ENCOUNTER
LMTCB to schedule appointment.    Pt is due for annual exam. Letter and Mychart message sent 11/8/22.    Brandi Eric on 11/15/2022 at 1:49 PM

## 2023-05-06 ENCOUNTER — HEALTH MAINTENANCE LETTER (OUTPATIENT)
Age: 33
End: 2023-05-06

## 2024-07-13 ENCOUNTER — HEALTH MAINTENANCE LETTER (OUTPATIENT)
Age: 34
End: 2024-07-13

## 2025-07-19 ENCOUNTER — HEALTH MAINTENANCE LETTER (OUTPATIENT)
Age: 35
End: 2025-07-19